# Patient Record
Sex: MALE | Race: WHITE
[De-identification: names, ages, dates, MRNs, and addresses within clinical notes are randomized per-mention and may not be internally consistent; named-entity substitution may affect disease eponyms.]

---

## 2018-06-29 ENCOUNTER — HOSPITAL ENCOUNTER (OUTPATIENT)
Dept: HOSPITAL 92 - RAD-FRANK | Age: 33
Discharge: HOME | End: 2018-06-29
Attending: NURSE PRACTITIONER
Payer: COMMERCIAL

## 2018-06-29 DIAGNOSIS — M79.641: Primary | ICD-10-CM

## 2018-06-29 DIAGNOSIS — Z87.81: ICD-10-CM

## 2018-06-29 NOTE — RAD
3 VIEWS RIGHT HAND:

 

Date:  06/29/18 

 

INDICATION:

Right hand pain. 

 

FINDINGS:

There has been interval healing of the instrumented long metacarpal shaft fracture. The low profile p
late and screw construct project in the expected position without aggressive complication. No acute f
racture is evident. 

 

IMPRESSION: 

Healed third metacarpal fracture. 

 

POS: [facility]